# Patient Record
Sex: FEMALE | Race: WHITE | Employment: STUDENT | URBAN - METROPOLITAN AREA
[De-identification: names, ages, dates, MRNs, and addresses within clinical notes are randomized per-mention and may not be internally consistent; named-entity substitution may affect disease eponyms.]

---

## 2017-01-13 ENCOUNTER — APPOINTMENT (OUTPATIENT)
Dept: LAB | Age: 22
End: 2017-01-13
Attending: FAMILY MEDICINE
Payer: COMMERCIAL

## 2017-01-13 DIAGNOSIS — D50.0 IRON DEFICIENCY ANEMIA DUE TO CHRONIC BLOOD LOSS: ICD-10-CM

## 2017-01-13 LAB
DEPRECATED HBV CORE AB SER IA-ACNC: 2.5 NG/ML (ref 10–291)
ERYTHROCYTE [DISTWIDTH] IN BLOOD BY AUTOMATED COUNT: 16.3 % (ref 11.5–16)
HCT VFR BLD AUTO: 35.6 % (ref 34–50)
HGB BLD-MCNC: 10.9 G/DL (ref 12–16)
MCH RBC QN AUTO: 24.3 PG (ref 27–33.2)
MCHC RBC AUTO-ENTMCNC: 30.6 G/DL (ref 31–37)
MCV RBC AUTO: 79.5 FL (ref 81–100)
PLATELET # BLD AUTO: 255 10(3)UL (ref 150–450)
RBC # BLD AUTO: 4.48 X10(6)UL (ref 3.8–5.1)
RED CELL DISTRIBUTION WIDTH-SD: 47.2 FL (ref 35.1–46.3)
WBC # BLD AUTO: 7.8 X10(3) UL (ref 4–13)

## 2017-01-13 PROCEDURE — 85027 COMPLETE CBC AUTOMATED: CPT

## 2017-01-13 PROCEDURE — 82728 ASSAY OF FERRITIN: CPT

## 2017-01-13 PROCEDURE — 36415 COLL VENOUS BLD VENIPUNCTURE: CPT

## 2017-01-19 DIAGNOSIS — D50.9 IRON DEFICIENCY ANEMIA, UNSPECIFIED: Primary | ICD-10-CM

## 2017-01-24 ENCOUNTER — PATIENT MESSAGE (OUTPATIENT)
Dept: FAMILY MEDICINE CLINIC | Facility: CLINIC | Age: 22
End: 2017-01-24

## 2017-01-26 NOTE — TELEPHONE ENCOUNTER
Please print an order for CBC, ferritin, vitamin B12, Vitamin D, CMP  Dx: Iron deficient anemia and fatigue. Will need to obtain mailing address for patient too and let her know we will mail order.   Maybe her health service at school can help her find a

## 2017-05-11 ENCOUNTER — LAB ENCOUNTER (OUTPATIENT)
Dept: LAB | Age: 22
End: 2017-05-11
Attending: FAMILY MEDICINE
Payer: COMMERCIAL

## 2017-05-11 DIAGNOSIS — D50.9 IRON DEFICIENCY ANEMIA, UNSPECIFIED: ICD-10-CM

## 2017-05-11 DIAGNOSIS — D50.9 IRON DEFICIENCY ANEMIA, UNSPECIFIED IRON DEFICIENCY ANEMIA TYPE: ICD-10-CM

## 2017-05-11 DIAGNOSIS — E55.9 VITAMIN D DEFICIENCY: ICD-10-CM

## 2017-05-11 PROCEDURE — 82728 ASSAY OF FERRITIN: CPT | Performed by: FAMILY MEDICINE

## 2017-05-11 PROCEDURE — 85025 COMPLETE CBC W/AUTO DIFF WBC: CPT | Performed by: FAMILY MEDICINE

## 2017-05-11 PROCEDURE — 36415 COLL VENOUS BLD VENIPUNCTURE: CPT | Performed by: FAMILY MEDICINE

## 2017-05-11 PROCEDURE — 82306 VITAMIN D 25 HYDROXY: CPT | Performed by: FAMILY MEDICINE

## 2017-05-11 NOTE — PROGRESS NOTES
Deedee Verde is a 22year old female. 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 Doing well in school. Not doing counseling regularly - sees someone as needed. Moods are good.   Motivation is go mood.  SKIN: no rashes,no suspicious lesions  HEENT: atraumatic, normocephalic,ears and throat are clear  NECK: supple,no adenopathy,no bruits  LUNGS: clear to auscultation  CARDIO: RRR without murmur  GI: good BS's,no masses, HSM or tenderness  EXTREMITIE

## 2017-06-19 ENCOUNTER — TELEPHONE (OUTPATIENT)
Dept: FAMILY MEDICINE CLINIC | Facility: CLINIC | Age: 22
End: 2017-06-19

## 2017-06-19 NOTE — TELEPHONE ENCOUNTER
Pt was transferred to triage. Stated that she is soaking through an ultra tampon and a heavy pad. Pt stated that her periods come once a month and are for 7 days, the first 3-4 days are super heavy and then taper down.   Pt stated that she would be availa

## 2017-06-19 NOTE — TELEPHONE ENCOUNTER
Pt currently experiencing a heavy period with nausea (no emesis). Currently on birth control.  Pt is down in [place] at school and will arrive back home Thurs evening only for the weekend and is asking to see Adi Montoya on Friday or does pt need to be seen

## 2017-06-20 NOTE — TELEPHONE ENCOUNTER
Call to pt's cell reaches identified voice mail. Left vmm req call back as soon as possible today to triage nurse for additional questions from radha GLASS. Provided ofc phone hours.

## 2017-06-20 NOTE — TELEPHONE ENCOUNTER
If she can come in Friday afternoon at 1:45 I can see her at that time.   Otherwise offer her appt with one of the other providers in the office if available Friday AM.  If she can only come Friday AM and no other availability here, could also see if Dr Noelle Huang

## 2017-06-20 NOTE — TELEPHONE ENCOUNTER
Incoming call from patient, states she started her period on Saturday 6/17/2017, she was using 10 super tampons a day, she is now down to 7-8 a day, no tachycardia or lightheadedness, but has had a migraine for the last 2 days, she is not currently taking

## 2017-06-20 NOTE — TELEPHONE ENCOUNTER
Call pt-advised of radha GLASS's comments and offer of appt 6/23 145 pm. Pt voices understanding, sts will need to check with her mom to see if that will work. Sts will call back to confirm.   Advised will schedule appt for now so that it is not gone when

## 2017-06-20 NOTE — TELEPHONE ENCOUNTER
Call back from pt-sts she will not be able to make 145 pm appt 6/23 with radha GLASS due to leaving town again before that time Friday. Offered other appt option mentioned by radha, with other provider in our ofc-dr lara-Friday morning.  Patient v

## 2017-06-23 ENCOUNTER — OFFICE VISIT (OUTPATIENT)
Dept: FAMILY MEDICINE CLINIC | Facility: CLINIC | Age: 22
End: 2017-06-23

## 2017-06-23 VITALS
OXYGEN SATURATION: 98 % | DIASTOLIC BLOOD PRESSURE: 70 MMHG | TEMPERATURE: 99 F | HEIGHT: 69 IN | WEIGHT: 152 LBS | HEART RATE: 66 BPM | SYSTOLIC BLOOD PRESSURE: 100 MMHG | RESPIRATION RATE: 16 BRPM | BODY MASS INDEX: 22.51 KG/M2

## 2017-06-23 DIAGNOSIS — E55.9 VITAMIN D DEFICIENCY: ICD-10-CM

## 2017-06-23 DIAGNOSIS — J45.990 EXERCISE INDUCED BRONCHOSPASM: ICD-10-CM

## 2017-06-23 DIAGNOSIS — Z30.09 ENCOUNTER FOR OTHER GENERAL COUNSELING OR ADVICE ON CONTRACEPTION: ICD-10-CM

## 2017-06-23 DIAGNOSIS — N92.0 MENORRHAGIA WITH REGULAR CYCLE: Primary | ICD-10-CM

## 2017-06-23 PROCEDURE — 81025 URINE PREGNANCY TEST: CPT | Performed by: FAMILY MEDICINE

## 2017-06-23 PROCEDURE — 36416 COLLJ CAPILLARY BLOOD SPEC: CPT | Performed by: FAMILY MEDICINE

## 2017-06-23 PROCEDURE — 85018 HEMOGLOBIN: CPT | Performed by: FAMILY MEDICINE

## 2017-06-23 PROCEDURE — 99214 OFFICE O/P EST MOD 30 MIN: CPT | Performed by: FAMILY MEDICINE

## 2017-06-23 NOTE — PROGRESS NOTES
[facility] Office Note  Chief Complaint:   Patient presents with:  Menstrual Problem: x 1 week heavy period,headaches,severe menstrual cramps      HPI:   This is a 22year old female coming in for heavy menses.  Started her period 0.6 %   Immature Granulocyte % 0.4 %       Past Medical History   Diagnosis Date   • Attention deficit disorder without mention of hyperactivity    • Personal history of neurosis    • Iron deficiency anemia secondary to inadequate dietary iron intake    • of breath, wheezing, cough or sputum. GASTROINTESTINAL:  Denies abdominal pain, nausea, vomiting, constipation, diarrhea, or blood in stool. MUSCULOSKELETAL:  Denies weakness, muscle aches, back pain, joint pain, joint swelling or stiffness.   Dariela Crum pulses bilaterally. NEURO:  No deficit, normal gait, strength and tone, sensory intact. ASSESSMENT AND PLAN:   1.  Menorrhagia with regular cycle  Pregnancy test negative, hemoglobin 13, patient not amenable to changing to another OCP like lo loestrin F

## 2017-06-23 NOTE — PATIENT INSTRUCTIONS
Understanding Uterine Bleeding  Your uterine bleeding may be heavy. Or you may have bleeding between periods. These problems may be caused by hormonal imbalance.  Or they can be caused by uterine growths, an intrauterine device (IUD), bleeding disorder, o © 0174-7246 The [address] [place] [address], [address] [place]. All rights reserved. This information is not intended as a substitute for professional medical care. Always follow your healthcare professional's instructions.         Vitamin What other tests might I have along with this test?  A healthcare provider may also want to check your parathyroid hormone levels and your calcium levels.   What do my test results mean?   A result for a lab test may be affected by many things, including th Tell your healthcare provider if you take vitamin D supplements. Also be sure your provider knows about all medicines, herbs, vitamins, and supplements you are taking.  This includes medicines that don't need a prescription and any illicit drugs you may use

## 2017-11-24 ENCOUNTER — OFFICE VISIT (OUTPATIENT)
Dept: FAMILY MEDICINE CLINIC | Facility: CLINIC | Age: 22
End: 2017-11-24

## 2017-11-24 VITALS
SYSTOLIC BLOOD PRESSURE: 90 MMHG | RESPIRATION RATE: 16 BRPM | HEART RATE: 116 BPM | DIASTOLIC BLOOD PRESSURE: 68 MMHG | TEMPERATURE: 100 F

## 2017-11-24 DIAGNOSIS — H65.01 RIGHT ACUTE SEROUS OTITIS MEDIA, RECURRENCE NOT SPECIFIED: ICD-10-CM

## 2017-11-24 DIAGNOSIS — J01.10 ACUTE NON-RECURRENT FRONTAL SINUSITIS: Primary | ICD-10-CM

## 2017-11-24 PROCEDURE — 99213 OFFICE O/P EST LOW 20 MIN: CPT | Performed by: NURSE PRACTITIONER

## 2017-11-24 RX ORDER — CEFUROXIME AXETIL 500 MG/1
500 TABLET ORAL 2 TIMES DAILY
Qty: 20 TABLET | Refills: 0 | Status: SHIPPED | OUTPATIENT
Start: 2017-11-24 | End: 2017-12-04

## 2017-11-24 RX ORDER — PREDNISONE 20 MG/1
TABLET ORAL
Qty: 10 TABLET | Refills: 0 | Status: SHIPPED | OUTPATIENT
Start: 2017-11-24 | End: 2018-08-06 | Stop reason: ALTCHOICE

## 2017-11-25 NOTE — PROGRESS NOTES
CHIEF COMPLAINT:   \" Patient presents with:  Sinus Problem  Ear Pain    HPI:   Leslie Bansal is a 25year old female who presents with a hx of cold sx which progressed to Frontal sinus congestion and pressure.    She has had a right ear ache since yest Alcohol use: No                  REVIEW OF SYSTEMS:   GENERAL:  See HPI. Appetite is decreased.   SKIN: no rashes or abnormal skin lesions  HEENT: See HPI  LUNGS: denies shortness of breath or wheezing, See HPI  CARDIOVASCULAR: denies chest pain or pa

## 2018-08-06 ENCOUNTER — APPOINTMENT (OUTPATIENT)
Dept: LAB | Age: 23
End: 2018-08-06
Attending: FAMILY MEDICINE
Payer: COMMERCIAL

## 2018-08-06 DIAGNOSIS — Z86.2 HISTORY OF ANEMIA: ICD-10-CM

## 2018-08-06 DIAGNOSIS — D50.9 IRON DEFICIENCY ANEMIA, UNSPECIFIED IRON DEFICIENCY ANEMIA TYPE: ICD-10-CM

## 2018-08-06 LAB
DEPRECATED HBV CORE AB SER IA-ACNC: 22.9 NG/ML (ref 12–114)
ERYTHROCYTE [DISTWIDTH] IN BLOOD BY AUTOMATED COUNT: 12.9 % (ref 11.5–16)
HCT VFR BLD AUTO: 38.4 % (ref 34–50)
HGB BLD-MCNC: 12.6 G/DL (ref 12–16)
MCH RBC QN AUTO: 28.6 PG (ref 27–33.2)
MCHC RBC AUTO-ENTMCNC: 32.8 G/DL (ref 31–37)
MCV RBC AUTO: 87.3 FL (ref 81–100)
PLATELET # BLD AUTO: 262 10(3)UL (ref 150–450)
RBC # BLD AUTO: 4.4 X10(6)UL (ref 3.8–5.1)
RED CELL DISTRIBUTION WIDTH-SD: 40.8 FL (ref 35.1–46.3)
WBC # BLD AUTO: 11.3 X10(3) UL (ref 4–13)

## 2018-08-06 PROCEDURE — 36415 COLL VENOUS BLD VENIPUNCTURE: CPT | Performed by: FAMILY MEDICINE

## 2018-08-06 PROCEDURE — 85027 COMPLETE CBC AUTOMATED: CPT | Performed by: FAMILY MEDICINE

## 2018-08-06 PROCEDURE — 82728 ASSAY OF FERRITIN: CPT | Performed by: FAMILY MEDICINE

## 2018-08-06 NOTE — PROGRESS NOTES
Jose David Khan is a 26year old female. 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 Stopped zoloft this summer - noticed more anxiety off medication.   Still getting anxiety exertion  CARDIOVASCULAR: denies chest pain on exertion  GI: denies abdominal pain and denies heartburn  NEURO: denies headaches    EXAM:   /64   Pulse 72   Temp 98.1 °F (36.7 °C) (Oral)   Resp 16   Ht 69\"   Wt 160 lb   BMI 23.63 kg/m²   GENERAL: we

## 2018-11-19 ENCOUNTER — NURSE ONLY (OUTPATIENT)
Dept: FAMILY MEDICINE CLINIC | Facility: CLINIC | Age: 23
End: 2018-11-19
Payer: COMMERCIAL

## 2018-11-19 PROCEDURE — 90651 9VHPV VACCINE 2/3 DOSE IM: CPT | Performed by: FAMILY MEDICINE

## 2018-11-19 PROCEDURE — 90471 IMMUNIZATION ADMIN: CPT | Performed by: FAMILY MEDICINE

## 2018-11-19 NOTE — PROGRESS NOTES
Pt was seen today for a 2nd in series of 3 HPV vaccines. VIS given to pt with date timelines at the top. Pt voiced understanding. Vaccine given pt handled well.

## 2018-12-17 ENCOUNTER — LAB ENCOUNTER (OUTPATIENT)
Dept: LAB | Age: 23
End: 2018-12-17
Attending: STUDENT IN AN ORGANIZED HEALTH CARE EDUCATION/TRAINING PROGRAM
Payer: COMMERCIAL

## 2018-12-17 DIAGNOSIS — R11.15 INTRACTABLE CYCLICAL VOMITING WITH NAUSEA: ICD-10-CM

## 2018-12-17 DIAGNOSIS — K90.0 CELIAC DISEASE: ICD-10-CM

## 2018-12-17 DIAGNOSIS — K59.09 OTHER CONSTIPATION: ICD-10-CM

## 2018-12-17 PROCEDURE — 80053 COMPREHEN METABOLIC PANEL: CPT

## 2018-12-17 PROCEDURE — 85025 COMPLETE CBC W/AUTO DIFF WBC: CPT

## 2018-12-17 PROCEDURE — 85652 RBC SED RATE AUTOMATED: CPT

## 2018-12-17 PROCEDURE — 82784 ASSAY IGA/IGD/IGG/IGM EACH: CPT

## 2018-12-17 PROCEDURE — 83690 ASSAY OF LIPASE: CPT

## 2018-12-17 PROCEDURE — 84443 ASSAY THYROID STIM HORMONE: CPT

## 2018-12-17 PROCEDURE — 86140 C-REACTIVE PROTEIN: CPT

## 2018-12-17 PROCEDURE — 83516 IMMUNOASSAY NONANTIBODY: CPT

## 2018-12-27 ENCOUNTER — HOSPITAL ENCOUNTER (OUTPATIENT)
Dept: ULTRASOUND IMAGING | Age: 23
Discharge: HOME | End: 2018-12-27
Attending: STUDENT IN AN ORGANIZED HEALTH CARE EDUCATION/TRAINING PROGRAM
Payer: COMMERCIAL

## 2018-12-27 DIAGNOSIS — R11.15 INTRACTABLE CYCLICAL VOMITING WITH NAUSEA: ICD-10-CM

## 2018-12-27 PROCEDURE — 76700 US EXAM ABDOM COMPLETE: CPT | Performed by: STUDENT IN AN ORGANIZED HEALTH CARE EDUCATION/TRAINING PROGRAM

## 2019-01-03 PROBLEM — R14.1 GAS PAIN: Status: ACTIVE | Noted: 2019-01-03

## 2019-01-03 PROBLEM — R11.2 NAUSEA WITH VOMITING: Status: ACTIVE | Noted: 2019-01-03

## 2019-07-11 RX ORDER — SERTRALINE HYDROCHLORIDE 100 MG/1
100 TABLET, FILM COATED ORAL DAILY
Qty: 30 TABLET | Refills: 0 | OUTPATIENT
Start: 2019-07-11

## 2019-08-08 NOTE — PROGRESS NOTES
Claudell Louis is a 22year old female. CHIEF COMPLAINT   Follow up on zoloft  HPI:   Derm for acne. Taking 100 mg Zoloft daily. Doing well with current dose. Ran out a couple days ago. Moods good. Motivation good. Stress levels manageable.  Seeing on exertion  GI: denies abdominal pain and denies heartburn  NEURO: denies headaches    EXAM:   /62   Pulse 82   Resp 14   Ht 70\"   Wt 159 lb   BMI 22.81 kg/m²   GENERAL: well developed, well nourished,in no apparent distress  PSYCH: Normal affect.

## 2020-02-20 ENCOUNTER — OFFICE VISIT (OUTPATIENT)
Dept: FAMILY MEDICINE CLINIC | Facility: CLINIC | Age: 25
End: 2020-02-20
Payer: COMMERCIAL

## 2020-02-20 VITALS
SYSTOLIC BLOOD PRESSURE: 123 MMHG | DIASTOLIC BLOOD PRESSURE: 74 MMHG | WEIGHT: 140 LBS | OXYGEN SATURATION: 100 % | RESPIRATION RATE: 20 BRPM | HEIGHT: 70 IN | HEART RATE: 112 BPM | TEMPERATURE: 101 F | BODY MASS INDEX: 20.04 KG/M2

## 2020-02-20 DIAGNOSIS — J11.1 INFLUENZA-LIKE ILLNESS: ICD-10-CM

## 2020-02-20 DIAGNOSIS — R05.9 COUGH: ICD-10-CM

## 2020-02-20 DIAGNOSIS — J01.20 ACUTE ETHMOIDAL SINUSITIS, RECURRENCE NOT SPECIFIED: Primary | ICD-10-CM

## 2020-02-20 PROCEDURE — 99213 OFFICE O/P EST LOW 20 MIN: CPT | Performed by: NURSE PRACTITIONER

## 2020-02-20 RX ORDER — BENZONATATE 200 MG/1
200 CAPSULE ORAL 3 TIMES DAILY PRN
Qty: 30 CAPSULE | Refills: 0 | Status: SHIPPED | OUTPATIENT
Start: 2020-02-20 | End: 2021-05-27 | Stop reason: ALTCHOICE

## 2020-02-20 RX ORDER — AMOXICILLIN AND CLAVULANATE POTASSIUM 875; 125 MG/1; MG/1
1 TABLET, FILM COATED ORAL 2 TIMES DAILY
Qty: 20 TABLET | Refills: 0 | Status: SHIPPED | OUTPATIENT
Start: 2020-02-20 | End: 2020-03-01

## 2020-02-20 RX ORDER — ALBUTEROL SULFATE 90 UG/1
2 AEROSOL, METERED RESPIRATORY (INHALATION) EVERY 6 HOURS PRN
Qty: 1 INHALER | Refills: 0 | Status: SHIPPED | OUTPATIENT
Start: 2020-02-20 | End: 2021-02-19

## 2020-02-20 NOTE — PROGRESS NOTES
CHIEF COMPLAINT:   Patient presents with:  Cough    HPI:   Everardo Odonnell is a 22year old female who presents with upper respiratory symptoms for  2  weeks. Patient reports dry cough, nasal congestion, runny nose and bilateral ear congestion.  Patient r • Wears glasses       Past Surgical History:   Procedure Laterality Date   • COLONOSCOPY      age 4   • COLONOSCOPY     • EGD     • OTHER SURGICAL HISTORY      Right knee ACL   • TONSILLECTOMY  2004    with adenoidectomy      Family History   Problem Relat Claudell Louis is a 22year old female who presents with upper respiratory symptoms that are consistent with    ASSESSMENT:   Acute ethmoidal sinusitis, recurrence not specified  (primary encounter diagnosis)  Cough  Influenza-like illness    PLAN: Meds · Drainage that is thick and colored, instead of clear  · Cough  Diagnosing acute sinusitis  Your doctor will ask about your symptoms and health history. He or she will look at your ear, nose, and throat.  You usually won't need to have X-rays taken.    The Symptoms of the flu may be mild or severe. They can include extreme tiredness (wanting to stay in bed all day), chills, fevers, muscle aches, soreness with eye movement, headache, and a dry, hacking cough.   Home care  Follow these guidelines when caring fo · Severe weakness or dizziness  · You get a new fever or cough after getting better for a few days  Date Last Reviewed: 1/1/2017  © 4456-0250 The [address]. [address] [facility], [address] [place] [address]. All rights reserved.  This information is not

## 2020-02-23 ENCOUNTER — PATIENT MESSAGE (OUTPATIENT)
Dept: FAMILY MEDICINE CLINIC | Facility: CLINIC | Age: 25
End: 2020-02-23

## 2020-02-24 ENCOUNTER — TELEPHONE (OUTPATIENT)
Dept: FAMILY MEDICINE CLINIC | Facility: CLINIC | Age: 25
End: 2020-02-24

## 2020-02-24 RX ORDER — FLUCONAZOLE 150 MG/1
150 TABLET ORAL ONCE
Qty: 1 TABLET | Refills: 0 | Status: SHIPPED | OUTPATIENT
Start: 2020-02-24 | End: 2020-02-24

## 2020-02-24 NOTE — TELEPHONE ENCOUNTER
From: Jose Bowling  To: Jean Gregg PA-C  Sent: 2/23/2020 7:40 PM CST  Subject: Prescription Question    Hello   I went to the immediate care on Thur I had a sinus infection they gave me antibiotics.  However starting yesterday I started having

## 2020-02-24 NOTE — TELEPHONE ENCOUNTER
Pt on Augmentin since 02/20/20. She now has symptoms of a yeast infection. She would like a prescription. I pended Diflucan to you please authorize if appropriate.

## 2020-02-24 NOTE — TELEPHONE ENCOUNTER
Pt was seen in  2/20 and given ABX. She now thinks she has a yeast infection and would like to know if we can call something in to [id number], [address] [place] [address] [phone], [phone] please advise. Thank you.

## 2020-07-02 RX ORDER — SERTRALINE HYDROCHLORIDE 100 MG/1
100 TABLET, FILM COATED ORAL DAILY
Qty: 90 TABLET | Refills: 0 | Status: SHIPPED | OUTPATIENT
Start: 2020-07-02 | End: 2021-03-16

## 2020-07-09 ENCOUNTER — TELEPHONE (OUTPATIENT)
Dept: FAMILY MEDICINE CLINIC | Facility: CLINIC | Age: 25
End: 2020-07-09

## 2020-07-09 NOTE — TELEPHONE ENCOUNTER
Pt said had recently encountered individual who might have been asymptomatic w/Covid. Pt asked how can she be tested for Covid-19? She said she had no symptoms.

## 2020-07-09 NOTE — TELEPHONE ENCOUNTER
Pt of Annette Granados, see note below, discussed with pt CDC guideline, self isolation, wearing a mask, good handwashing, monitor temp, pt to contact us if she develops any symptoms. Voices understanding.

## 2020-07-14 ENCOUNTER — HOSPITAL ENCOUNTER (OUTPATIENT)
Age: 25
Discharge: HOME | End: 2020-07-14
Payer: COMMERCIAL

## 2020-07-14 ENCOUNTER — TELEPHONE (OUTPATIENT)
Dept: FAMILY MEDICINE CLINIC | Facility: CLINIC | Age: 25
End: 2020-07-14

## 2020-07-14 VITALS
RESPIRATION RATE: 14 BRPM | HEART RATE: 70 BPM | TEMPERATURE: 98 F | DIASTOLIC BLOOD PRESSURE: 67 MMHG | SYSTOLIC BLOOD PRESSURE: 117 MMHG | OXYGEN SATURATION: 100 %

## 2020-07-14 DIAGNOSIS — H65.01 NON-RECURRENT ACUTE SEROUS OTITIS MEDIA OF RIGHT EAR: ICD-10-CM

## 2020-07-14 DIAGNOSIS — H60.331 ACUTE SWIMMER'S EAR OF RIGHT SIDE: ICD-10-CM

## 2020-07-14 DIAGNOSIS — H92.09 OTALGIA, UNSPECIFIED LATERALITY: Primary | ICD-10-CM

## 2020-07-14 PROCEDURE — 99203 OFFICE O/P NEW LOW 30 MIN: CPT

## 2020-07-14 PROCEDURE — 99204 OFFICE O/P NEW MOD 45 MIN: CPT

## 2020-07-14 RX ORDER — AMOXICILLIN 500 MG/1
500 TABLET, FILM COATED ORAL 2 TIMES DAILY
Qty: 14 TABLET | Refills: 0 | Status: SHIPPED | OUTPATIENT
Start: 2020-07-14 | End: 2020-07-21

## 2020-07-14 RX ORDER — FLUCONAZOLE 150 MG/1
TABLET ORAL
Qty: 2 TABLET | Refills: 0 | Status: SHIPPED | OUTPATIENT
Start: 2020-07-14 | End: 2021-05-27 | Stop reason: ALTCHOICE

## 2020-07-14 RX ORDER — FLUCONAZOLE 150 MG/1
150 TABLET ORAL ONCE
Qty: 1 TABLET | Refills: 0 | Status: SHIPPED | OUTPATIENT
Start: 2020-07-14 | End: 2020-07-14

## 2020-07-14 NOTE — ED PROVIDER NOTES
Patient Seen in: [facility]      History   Patient presents with:  Ear Pain    Stated Complaint: Ear infection    Everardo Odonnell is a 26year old female is here for ear pain for the last day. Modified by: otc meds.  Tri Resp 14   Temp 98.1 °F (36.7 °C)   Temp src Temporal   SpO2 100 %   O2 Device None (Room air)       Current:/67   Pulse 70   Temp 98.1 °F (36.7 °C) (Temporal)   Resp 14   SpO2 100%         Physical Exam  Vitals signs and nursing note reviewed.    Co Clinical Impression:  Otalgia, unspecified laterality  (primary encounter diagnosis)  Non-recurrent acute serous otitis media of right ear  Acute swimmer's ear of right side    Disposition:  Discharge  7/14/2020  4:06 pm    Follow-up:  Anthony Maldonado

## 2020-07-14 NOTE — TELEPHONE ENCOUNTER
Should be seen with ear pain. Can be seen at urgent care today or Dr. Robin Barkley tomorrow (if severe pain shouldn't wait to be seen until tomorrow and should be seen at [address] [place] [address] today).   If seeing Dr. Robin Barkley, please route call to him to ensure he is ok with seeing her

## 2020-07-14 NOTE — TELEPHONE ENCOUNTER
Call back from pt-advised of juan GLASS comments/recommendations. Pt sts she will go to immediate care later today-provided immediate care location info.    Pt sts prefers [place] location-provided address, open until 8pm but advised try to arrive no la

## 2020-07-14 NOTE — TELEPHONE ENCOUNTER
Pt has right ear pain and states that she has been doing a lot of swimming lately and that she is prone to ear infections after swimming. Pain has been there for 4 days and denies a fever or other symptoms. Please advise.

## 2020-07-14 NOTE — TELEPHONE ENCOUNTER
Call to pt's cell reaches identified voice mail. Left vmm req call back to triage nurse today for radha GLASS instructions. Provided ofc phone hours.

## 2020-07-30 ENCOUNTER — TELEPHONE (OUTPATIENT)
Dept: FAMILY MEDICINE CLINIC | Facility: CLINIC | Age: 25
End: 2020-07-30

## 2020-07-30 NOTE — TELEPHONE ENCOUNTER
I spoke with pt. She was with a friend last Friday who is now [place] -19 positive. She did not have a mask on and was within 6 feet of the person. She currently has no symptoms. I advised her to self quarantine for 14 days from the day of exposure.  If she b

## 2020-07-30 NOTE — TELEPHONE ENCOUNTER
Pt states she was in direct contact with someone who has tested positive for COVID.last Friday. she has no symptoms/  She would like to know what to do and if she can get tested thank you

## 2020-08-10 ENCOUNTER — TELEPHONE (OUTPATIENT)
Dept: FAMILY MEDICINE CLINIC | Facility: CLINIC | Age: 25
End: 2020-08-10

## 2020-08-10 NOTE — TELEPHONE ENCOUNTER
Pt scheduled Physical w/Stephanie Dressler for Tues., 8/11/2020 @ 1pm.     In our phone Travel Screen on Mon., 8/10/2020, Pt said she did encounter individual who tested positive for Covid-19.      Pt said she self-quarantined and tested twice for Covid-19,

## 2020-11-03 ENCOUNTER — OFFICE VISIT (OUTPATIENT)
Dept: OBGYN CLINIC | Facility: CLINIC | Age: 25
End: 2020-11-03
Payer: COMMERCIAL

## 2020-11-03 VITALS
HEART RATE: 118 BPM | DIASTOLIC BLOOD PRESSURE: 71 MMHG | BODY MASS INDEX: 22.41 KG/M2 | HEIGHT: 69.5 IN | WEIGHT: 154.81 LBS | SYSTOLIC BLOOD PRESSURE: 122 MMHG

## 2020-11-03 DIAGNOSIS — Z12.4 CERVICAL CANCER SCREENING: ICD-10-CM

## 2020-11-03 DIAGNOSIS — Z30.431 IUD CHECK UP: ICD-10-CM

## 2020-11-03 DIAGNOSIS — Z01.419 ENCOUNTER FOR GYNECOLOGICAL EXAMINATION WITHOUT ABNORMAL FINDING: Primary | ICD-10-CM

## 2020-11-03 DIAGNOSIS — T83.32XA INTRAUTERINE CONTRACEPTIVE DEVICE THREADS LOST, INITIAL ENCOUNTER: ICD-10-CM

## 2020-11-03 PROCEDURE — 99385 PREV VISIT NEW AGE 18-39: CPT | Performed by: OBSTETRICS & GYNECOLOGY

## 2020-11-03 PROCEDURE — 99072 ADDL SUPL MATRL&STAF TM PHE: CPT | Performed by: OBSTETRICS & GYNECOLOGY

## 2020-11-03 PROCEDURE — 3074F SYST BP LT 130 MM HG: CPT | Performed by: OBSTETRICS & GYNECOLOGY

## 2020-11-03 PROCEDURE — 3008F BODY MASS INDEX DOCD: CPT | Performed by: OBSTETRICS & GYNECOLOGY

## 2020-11-03 PROCEDURE — 3078F DIAST BP <80 MM HG: CPT | Performed by: OBSTETRICS & GYNECOLOGY

## 2020-11-03 NOTE — PROGRESS NOTES
Well Woman Exam    HPI:  The patient is a 20yo female who presents today for annual exam. She states she had an IUD placed while in college. She is unsure what year or what IUD. It was placed for heavy menses. She has no issues.  She does have light four da Substance and Sexual Activity      Alcohol use: No      Drug use: No      Sexual activity: Not on file    Lifestyle      Physical activity        Days per week: Not on file        Minutes per session: Not on file      Stress: Not on file    Relationships  MG Oral Tab, Take 1 tablet (100 mg total) by mouth daily. , Disp: 90 tablet, Rfl: 0  •  benzonatate 200 MG Oral Cap, Take 1 capsule (200 mg total) by mouth 3 (three) times daily as needed for cough. , Disp: 30 capsule, Rfl: 0  •  Albuterol Sulfate HF supraclavicular or axillary adenopathy is noted  Breast: normal without palpable masses, tenderness, asymmetry, nipple discharge, nipple retraction or skin changes  Abdomen:  soft, nontender, nondistended, no masses  Skin/Hair: no unusual rashes or bruises

## 2021-01-26 ENCOUNTER — PATIENT MESSAGE (OUTPATIENT)
Dept: FAMILY MEDICINE CLINIC | Facility: CLINIC | Age: 26
End: 2021-01-26

## 2021-01-26 ENCOUNTER — TELEPHONE (OUTPATIENT)
Dept: FAMILY MEDICINE CLINIC | Facility: CLINIC | Age: 26
End: 2021-01-26

## 2021-01-26 NOTE — TELEPHONE ENCOUNTER
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

## 2021-03-05 RX ORDER — SERTRALINE HYDROCHLORIDE 100 MG/1
100 TABLET, FILM COATED ORAL DAILY
Qty: 90 TABLET | Refills: 0 | OUTPATIENT
Start: 2021-03-05

## 2021-05-27 ENCOUNTER — OFFICE VISIT (OUTPATIENT)
Dept: FAMILY MEDICINE CLINIC | Facility: CLINIC | Age: 26
End: 2021-05-27
Payer: COMMERCIAL

## 2021-05-27 VITALS
WEIGHT: 149 LBS | RESPIRATION RATE: 12 BRPM | HEART RATE: 76 BPM | BODY MASS INDEX: 21.82 KG/M2 | SYSTOLIC BLOOD PRESSURE: 104 MMHG | HEIGHT: 69.25 IN | DIASTOLIC BLOOD PRESSURE: 60 MMHG

## 2021-05-27 DIAGNOSIS — Z00.00 BLOOD TESTS FOR ROUTINE GENERAL PHYSICAL EXAMINATION: ICD-10-CM

## 2021-05-27 DIAGNOSIS — E55.9 VITAMIN D DEFICIENCY: ICD-10-CM

## 2021-05-27 DIAGNOSIS — K90.0 CELIAC DISEASE: ICD-10-CM

## 2021-05-27 DIAGNOSIS — Z00.00 ROUTINE GENERAL MEDICAL EXAMINATION AT A HEALTH CARE FACILITY: Primary | ICD-10-CM

## 2021-05-27 PROCEDURE — 3008F BODY MASS INDEX DOCD: CPT | Performed by: FAMILY MEDICINE

## 2021-05-27 PROCEDURE — 99395 PREV VISIT EST AGE 18-39: CPT | Performed by: FAMILY MEDICINE

## 2021-05-27 PROCEDURE — 3074F SYST BP LT 130 MM HG: CPT | Performed by: FAMILY MEDICINE

## 2021-05-27 PROCEDURE — 3078F DIAST BP <80 MM HG: CPT | Performed by: FAMILY MEDICINE

## 2021-05-27 RX ORDER — SERTRALINE HYDROCHLORIDE 100 MG/1
TABLET, FILM COATED ORAL
Qty: 135 TABLET | Refills: 1 | Status: SHIPPED | OUTPATIENT
Start: 2021-05-27

## 2021-05-27 NOTE — PROGRESS NOTES
CHIEF COMPLAINT   Complete physical  HPI:   Seirra Perea is a 26year old female who presents for a complete physical exam.   Paternal grandmother - history of breast cancer in her 35s. Sertraline 100 mg daily.   During her period her anxiety increa • Food intolerance    • Frequent use of laxatives    • History of depression    • Iron deficiency anemia secondary to inadequate dietary iron intake    • Nausea    • Personal history of neurosis    • Stress    • Uncomfortable fullness after meals    • Vi masses. BREAST: No masses. No lymph adenopathy. No nipple discharge.   LUNGS: clear to auscultation; no rhonchi, rales, or wheezing  CARDIO: RRR without murmur  GI: good BS's,no masses, organomegaly or tenderness  :DEFERRED TO GYNE   MUSCULOSKELETAL: N

## 2021-12-21 ENCOUNTER — IMMUNIZATION (OUTPATIENT)
Dept: LAB | Facility: HOSPITAL | Age: 26
End: 2021-12-21
Attending: EMERGENCY MEDICINE
Payer: COMMERCIAL

## 2021-12-21 DIAGNOSIS — Z23 NEED FOR VACCINATION: Primary | ICD-10-CM

## 2021-12-21 PROCEDURE — 0004A SARSCOV2 VAC 30MCG/0.3ML IM: CPT | Performed by: NURSE PRACTITIONER

## 2022-06-28 RX ORDER — SERTRALINE HYDROCHLORIDE 100 MG/1
TABLET, FILM COATED ORAL
Qty: 135 TABLET | Refills: 0 | Status: SHIPPED | OUTPATIENT
Start: 2022-06-28

## 2022-08-04 ENCOUNTER — TELEMEDICINE (OUTPATIENT)
Dept: FAMILY MEDICINE CLINIC | Facility: CLINIC | Age: 27
End: 2022-08-04
Payer: COMMERCIAL

## 2022-08-04 DIAGNOSIS — F32.A DEPRESSION, UNSPECIFIED DEPRESSION TYPE: Primary | ICD-10-CM

## 2022-08-04 DIAGNOSIS — U07.1 COVID-19: ICD-10-CM

## 2022-08-04 PROCEDURE — 99213 OFFICE O/P EST LOW 20 MIN: CPT | Performed by: FAMILY MEDICINE

## 2022-08-04 RX ORDER — SERTRALINE HYDROCHLORIDE 100 MG/1
TABLET, FILM COATED ORAL
Qty: 135 TABLET | Refills: 1 | Status: SHIPPED | OUTPATIENT
Start: 2022-08-04

## 2022-09-27 ENCOUNTER — PATIENT MESSAGE (OUTPATIENT)
Dept: FAMILY MEDICINE CLINIC | Facility: CLINIC | Age: 27
End: 2022-09-27

## 2022-09-29 PROBLEM — F41.9 ANXIETY: Status: ACTIVE | Noted: 2022-09-29

## 2022-11-02 ENCOUNTER — PATIENT MESSAGE (OUTPATIENT)
Dept: FAMILY MEDICINE CLINIC | Facility: CLINIC | Age: 27
End: 2022-11-02

## 2022-11-03 NOTE — TELEPHONE ENCOUNTER
From: Mraio Pan  To: Armaan Donohue PA-C  Sent: 11/2/2022 6:16 PM CDT  Subject: New Medication    Good evening,    The new medication I am taking has given me migrane-level headaches and made me feel nauseous. I was wondering if we could set up an e-visit to take talk about other options. Also, would it be possible to make this appointment an e-visit?      Thank you,    Kushal Orr

## 2022-11-09 PROBLEM — R11.2 NAUSEA WITH VOMITING: Status: RESOLVED | Noted: 2019-01-03 | Resolved: 2022-11-09

## 2022-11-09 PROBLEM — R14.1 GAS PAIN: Status: RESOLVED | Noted: 2019-01-03 | Resolved: 2022-11-09

## 2023-01-18 ENCOUNTER — PATIENT MESSAGE (OUTPATIENT)
Dept: FAMILY MEDICINE CLINIC | Facility: CLINIC | Age: 28
End: 2023-01-18

## 2023-01-18 NOTE — TELEPHONE ENCOUNTER
From: Erme Moreira  To: Krissy Bansal PA-C  Sent: 1/18/2023 1:58 PM CST  Subject: Sinus Infection     Good afternoon,    I just tried to get medication for a sinus infection through an e-visit. I started having mild symptoms last week but began feeling sick on Friday. It has been getting progressively worse, and overall pressure and pain in my head, neck, and eyes.

## 2023-01-18 NOTE — TELEPHONE ENCOUNTER
I can do a video visit with her on Friday if there is still an available spot. If not available on Friday, I can do a video visit or in person visit with her on 1/19/23 at 1:30.

## 2023-05-08 ENCOUNTER — PATIENT MESSAGE (OUTPATIENT)
Dept: FAMILY MEDICINE CLINIC | Facility: CLINIC | Age: 28
End: 2023-05-08

## 2023-05-08 RX ORDER — SERTRALINE HYDROCHLORIDE 100 MG/1
TABLET, FILM COATED ORAL
Qty: 60 TABLET | Refills: 0 | Status: SHIPPED | OUTPATIENT
Start: 2023-05-08

## 2023-05-08 RX ORDER — SERTRALINE HYDROCHLORIDE 100 MG/1
TABLET, FILM COATED ORAL
Qty: 180 TABLET | Refills: 0 | Status: CANCELLED | OUTPATIENT
Start: 2023-05-08

## 2023-05-08 NOTE — TELEPHONE ENCOUNTER
From: Bijan Lomax  To: Fransico Lima PA-C  Sent: 5/8/2023 1:02 PM CDT  Subject: refill    I saw that you put in. a refill for my medication however they say they don't have it and will not give me any medication until we figure this out. can someone please call or send it thru to them you have the right pharmacy so I don't know what the problem is. but I have been out since weds last week.  sorry about this

## 2023-11-07 RX ORDER — SERTRALINE HYDROCHLORIDE 100 MG/1
TABLET, FILM COATED ORAL
Qty: 60 TABLET | Refills: 0 | Status: SHIPPED | OUTPATIENT
Start: 2023-11-07

## 2023-12-04 ENCOUNTER — PATIENT MESSAGE (OUTPATIENT)
Dept: FAMILY MEDICINE CLINIC | Facility: CLINIC | Age: 28
End: 2023-12-04

## 2023-12-04 NOTE — TELEPHONE ENCOUNTER
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 I will join the appointment as soon as possible. Thank you.      Jada

## 2024-01-23 ENCOUNTER — PATIENT MESSAGE (OUTPATIENT)
Dept: FAMILY MEDICINE CLINIC | Facility: CLINIC | Age: 29
End: 2024-01-23

## 2024-01-23 ENCOUNTER — TELEMEDICINE (OUTPATIENT)
Dept: FAMILY MEDICINE CLINIC | Facility: CLINIC | Age: 29
End: 2024-01-23
Payer: COMMERCIAL

## 2024-01-23 DIAGNOSIS — J01.00 ACUTE NON-RECURRENT MAXILLARY SINUSITIS: Primary | ICD-10-CM

## 2024-01-23 PROCEDURE — 99442 PHONE E/M BY PHYS 11-20 MIN: CPT | Performed by: FAMILY MEDICINE

## 2024-01-23 RX ORDER — FLUCONAZOLE 150 MG/1
150 TABLET ORAL ONCE
Qty: 1 TABLET | Refills: 0 | Status: SHIPPED | OUTPATIENT
Start: 2024-01-23 | End: 2024-01-23

## 2024-01-23 RX ORDER — BENZONATATE 200 MG/1
200 CAPSULE ORAL 3 TIMES DAILY PRN
Qty: 40 CAPSULE | Refills: 0 | Status: SHIPPED | OUTPATIENT
Start: 2024-01-23

## 2024-01-23 RX ORDER — AMOXICILLIN AND CLAVULANATE POTASSIUM 875; 125 MG/1; MG/1
1 TABLET, FILM COATED ORAL 2 TIMES DAILY
Qty: 14 TABLET | Refills: 0 | Status: SHIPPED | OUTPATIENT
Start: 2024-01-23 | End: 2024-01-30

## 2024-01-23 NOTE — TELEPHONE ENCOUNTER
Pt called back. She has had a prod cough, PND , ST and sinus congestion for 2 weeks. She tested for COVID on Sunday and it was negative. She would like to do a virtual visit today. She can not come in. 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 Any other time is fine. Please advise

## 2024-01-23 NOTE — PROGRESS NOTES
Virtual/Telephone Check-In    Jada Sanchez verbally consents to a Virtual/Telephone Check-In service on 01/23/24. Patient understands and accepts financial responsibility for any deductible, co-insurance and/or co-pays associated with this service. This visit is conducted using Telemedicine with live audio.     I returned Jada Sanchez call by secure telephone chat, verified date of birth, and discussed their current concerns:     .  28-year-old female that presents due to sinus pressure congestion cough has been going on for the last 2 weeks.  The patient states that the cough has been productive at times.  She states that she did have a fever last week.  Denies any shortness of breath states that she tested for COVID-19 and was negative.    Review of Systems:   Ten point review of systems has been performed and is otherwise negative and/or non-contributory, except as described above.    Current Outpatient Medications   Medication Sig Dispense Refill    amoxicillin clavulanate 875-125 MG Oral Tab Take 1 tablet by mouth 2 (two) times daily for 7 days. 14 tablet 0    benzonatate 200 MG Oral Cap Take 1 capsule (200 mg total) by mouth 3 (three) times daily as needed for cough. 40 capsule 0    fluconazole (DIFLUCAN) 150 MG Oral Tab Take 1 tablet (150 mg total) by mouth once for 1 dose. 1 tablet 0    sertraline 100 MG Oral Tab TAKE TWO TABLETS BY MOUTH DAILY 180 tablet 1    tretinoin 0.025 % External Cream apply a pea size amount to affected area of acne every night at bedtime as tolerated 45 g 5    triamcinolone acetonide 0.1 % External Cream Apply a thin layer to aa of neck and chest bid until clear, then prn flares 28.4 g 1     Patient Active Problem List   Diagnosis    Vitamin D deficiency    Celiac disease    Other allergy, other than to medicinal agents    Exercise induced bronchospasm    Severe single current episode of major depressive disorder, without psychotic features (HCC)    Anxiety       Physical  Exam:  General: well-appearing individual in no apparent distress    HEENT: Head appears NCAT  Extra-ocular movements grossly intact  Eyes appear healthy, non-injected, no exudate or discharge  Nares no discharge  Oropharynx appears normal positive pain to palpation of bilateral maxillary sinuses  Mouth has moist mucus membranes    Neck: Neck appears supple  no hesitation with movement of the neck  no cervical lymphadenopathy    Cardiovascular: hands and fingers are pink and well-perfused    Lungs: comfortable respirations  Positive cough  no tachypnea  no retractions appreciated  no wheezing appreciated      Diagnosis:  1. Acute non-recurrent maxillary sinusitis  - amoxicillin clavulanate 875-125 MG Oral Tab; Take 1 tablet by mouth 2 (two) times daily for 7 days.  Dispense: 14 tablet; Refill: 0  - benzonatate 200 MG Oral Cap; Take 1 capsule (200 mg total) by mouth 3 (three) times daily as needed for cough.  Dispense: 40 capsule; Refill: 0  - fluconazole (DIFLUCAN) 150 MG Oral Tab; Take 1 tablet (150 mg total) by mouth once for 1 dose.  Dispense: 1 tablet; Refill: 0  I did discuss with the patient at this time with the duration of symptoms I would suggest starting her on Augmentin she was asked to use this twice a day for the next 7 days she was also asked to use Flonase twice a day she was given a prescription for benzonatate pills to help with the cough.  She was also given Diflucan.  She was asked to use this if she does have any symptoms after using the antibiotics.  She states that she has had a history of yeast infections.    Follow up: As needed  Duration of service, in minutes:  14  Patient also advised to follow CDC guidelines for self isolation/social distancing and symptomatic treatment as outlined on CDC Patient Guidelines.  Jian Barron MD

## 2024-01-23 NOTE — TELEPHONE ENCOUNTER
From: Jada Sanchez  To: Stephanie Dressler  Sent: 1/23/2024 6:43 AM CST  Subject: Sinus Infection    For the past two weeks I’ve had a cough and runny nose with yellow phlegm. I was hoping to talk to you about getting antibiotics for a sinus infection. For the antibiotics can I get Diflucan to avoid a UTI?     Thank you,   Jaad

## 2024-04-23 ENCOUNTER — TELEPHONE (OUTPATIENT)
Dept: FAMILY MEDICINE CLINIC | Facility: CLINIC | Age: 29
End: 2024-04-23

## 2024-04-23 NOTE — TELEPHONE ENCOUNTER
Pt has an ongoing cough, sinus pressure and ear pain for the past 21/2 - 3 weeks. Appt made for pt to see Dr. Barron tomorrow at 7:00 am Pt. Agreed to plan and verbalized understanding

## 2024-04-23 NOTE — TELEPHONE ENCOUNTER
Pt requesting video visit for ongoing sinus/ear infection.    States symptoms have been ongoing 2 1/2 weeks.     Was seen virtually w/ Dr Barron 1/23, indicates little/no improvement.    Please advise if /when to schedule?

## 2024-06-19 ENCOUNTER — PATIENT MESSAGE (OUTPATIENT)
Dept: FAMILY MEDICINE CLINIC | Facility: CLINIC | Age: 29
End: 2024-06-19

## 2024-06-20 NOTE — TELEPHONE ENCOUNTER
Appointment has been scheduled for med check.  Patient would like a virtual visit.  Would this be acceptable?

## 2024-11-20 ENCOUNTER — PATIENT MESSAGE (OUTPATIENT)
Dept: FAMILY MEDICINE CLINIC | Facility: CLINIC | Age: 29
End: 2024-11-20

## 2024-11-20 DIAGNOSIS — Z00.00 ROUTINE GENERAL MEDICAL EXAMINATION AT A HEALTH CARE FACILITY: Primary | ICD-10-CM

## 2024-11-21 NOTE — TELEPHONE ENCOUNTER
Patient has upcoming appointment on 12/18.  Would you like to order blood work prior to appointment?  Route orders for annual physical labs pended for approval if appropriate.

## 2025-02-14 RX ORDER — SERTRALINE HYDROCHLORIDE 100 MG/1
TABLET, FILM COATED ORAL
Qty: 60 TABLET | Refills: 0 | OUTPATIENT
Start: 2025-02-14

## 2025-02-14 NOTE — TELEPHONE ENCOUNTER
Last Office Visit: 6/25/24 (telemedicine)  Next Office Visit: patient is asked to return in 6 months   Last Refill:6/25/24  Medication Quantity Refills Start End   sertraline 100 MG Oral Tab 180 tablet 1 6/25/2024 --   Sig:   TAKE TWO TABLETS BY MOUTH DAILY       MCM sent to Pt to schedule annual exam appt.    Please authorize if acceptable.   Thank you!

## 2025-03-14 ENCOUNTER — PATIENT MESSAGE (OUTPATIENT)
Dept: FAMILY MEDICINE CLINIC | Facility: CLINIC | Age: 30
End: 2025-03-14

## 2025-03-14 RX ORDER — SERTRALINE HYDROCHLORIDE 100 MG/1
TABLET, FILM COATED ORAL
Qty: 60 TABLET | Refills: 0 | Status: SHIPPED | OUTPATIENT
Start: 2025-03-14

## 2025-08-05 ENCOUNTER — MED REC SCAN ONLY (OUTPATIENT)
Dept: FAMILY MEDICINE CLINIC | Facility: CLINIC | Age: 30
End: 2025-08-05

## (undated) NOTE — MR AVS SNAPSHOT
After Visit Summary   11/3/2020    Radha Briggs    MRN: [id number]           Visit Information     Date & Time  11/3/2020  3:00 PM Provider  Jana Maza MD [address] [place] [address], [address] [place] [address],[address], [facility]  Phone Cervical cancer screening   [[id number]]    IUD check up   [114991]    Intrauterine contraceptive device threads lost, initial encounter   [[id number]]             We Ordered the Following     Normal Orders This Visit    THINPREP PAP WITH HPV REFLEX REQUEST B [LA Treatment for mild illness or injury that does not require immediate attention VIDEO VISITS  Average cost  $35*    e-VISTS  Average cost  $35*     SAME DAY APPOINTMENTS   Available at primary care offices    [address] [place] [address]  OFFICE VISIT   [facility]

## (undated) NOTE — Clinical Note
Dear Dr. Solange Roa,  Thank you for referring Fe Cones to the [facility] in [place].   Sincerely,  Wolfgang Myers NP

## (undated) NOTE — Clinical Note
ASTHMA [employer] PLAN for Royal Krueger     : 1995     Date: 2017  Provider:  Kenia Cabrera PA-C  Phone for doctor or clinic: [facility], [facility], [address] [phone]

## (undated) NOTE — LETTER
7/5/2019    [address] [place] [address]    Dear Ms. Lashell Vicente office has been trying to contact you to schedule a visit with your doctor to address important healthcare needs. It is important that you contact our office at your earliest convenience. Also,  Did you know that you can receive test result information and reminders securely on line through [address] [place] [address] [address]? To register for [employer], open your Internet browser and go to [email or web address] and click \"sign up now\". Follow the on-screen instructions to complete your registration. Thank you for your prompt attention to this matter. You may reach our office at [phone].      Sincerely,      [employer] and Staff

## (undated) NOTE — Clinical Note
ASTHMA ACTION PLAN for Will Blunt     : 1995     Date: 2017  Provider:  Jaylon Watson MD  Phone for doctor or clinic: 0059 [facility], [address] [address] [id number] [phone]

## (undated) NOTE — MR AVS SNAPSHOT
[facility], [address] [facility]  [phone]               Thank you for choosing us for your health care visit with Zach Zapata MD.  We are glad to serve you and happy to provide you with this summ · Anorexia (an eating disorder)  · Pregnancy  Uterine growths  There are different kinds of uterine growths. These include:  · Fibroids. These are round “knots” of muscle tissue in the uterus. · Polyps. These are soft tissue growths in the uterine lining. a form circulating in the body called 25-hydroxyvitamin D, also called 25(OH)D.   Why do I need this test?  Vitamin D testing has become much more popular in recent years.  Your healthcare provider may check your vitamin D levels to find out if you have any it should. This might be because of kidney or liver disease. Above-normal levels may be a sign that you're taking too much in supplement form.   How is this test done?   The test requires a blood sample, which is drawn through a needle from a vein in your Inhale 2 puffs into the lungs every 6 (six) hours as needed. For wheezing   Commonly known as:  PROAIR HFA           Norethin Ace-Eth Estrad-FE 1.5-30 MG-MCG Tabs   Take 1 tablet by mouth daily.    Commonly known as:  LOESTRIN FE 1.5/30           Sertraline

## (undated) NOTE — MR AVS SNAPSHOT
[facility], [address] [phone]               Thank you for choosing us for your health care visit with Evaristo Wheeler PA-C.   We are glad to serve you and happy to provide you with this Where to Get Your Medications      These medications were sent to [address] [place] [address], [address] [facility] [phone], [phone]  [facility] [address] Montiel, [facility]     Phone:  [phone]    - Norethin Ace-Eth Estrad-FE 1.5-30 MG-M